# Patient Record
Sex: MALE | Race: WHITE | NOT HISPANIC OR LATINO | Employment: FULL TIME | URBAN - METROPOLITAN AREA
[De-identification: names, ages, dates, MRNs, and addresses within clinical notes are randomized per-mention and may not be internally consistent; named-entity substitution may affect disease eponyms.]

---

## 2023-10-18 ENCOUNTER — OFFICE VISIT (OUTPATIENT)
Dept: PRIMARY CARE | Facility: CLINIC | Age: 38
End: 2023-10-18
Payer: COMMERCIAL

## 2023-10-18 ENCOUNTER — LAB (OUTPATIENT)
Dept: LAB | Facility: LAB | Age: 38
End: 2023-10-18
Payer: COMMERCIAL

## 2023-10-18 VITALS
WEIGHT: 263 LBS | SYSTOLIC BLOOD PRESSURE: 117 MMHG | HEART RATE: 79 BPM | BODY MASS INDEX: 33.75 KG/M2 | HEIGHT: 74 IN | OXYGEN SATURATION: 97 % | DIASTOLIC BLOOD PRESSURE: 76 MMHG

## 2023-10-18 DIAGNOSIS — I87.8 CHRONIC VENOUS STASIS: ICD-10-CM

## 2023-10-18 DIAGNOSIS — M79.89 LEG SWELLING: ICD-10-CM

## 2023-10-18 DIAGNOSIS — T78.3XXA ANGIOEDEMA, INITIAL ENCOUNTER: ICD-10-CM

## 2023-10-18 DIAGNOSIS — Z82.49 FAMILY HISTORY OF DVT: ICD-10-CM

## 2023-10-18 DIAGNOSIS — Z86.718 HX OF DEEP VENOUS THROMBOSIS: ICD-10-CM

## 2023-10-18 DIAGNOSIS — D68.51 FACTOR V LEIDEN (MULTI): ICD-10-CM

## 2023-10-18 DIAGNOSIS — I87.2 CHRONIC VENOUS STASIS DERMATITIS: ICD-10-CM

## 2023-10-18 DIAGNOSIS — D68.51 FACTOR V LEIDEN (MULTI): Primary | ICD-10-CM

## 2023-10-18 PROBLEM — F90.2 ATTENTION DEFICIT HYPERACTIVITY DISORDER (ADHD), COMBINED TYPE: Status: ACTIVE | Noted: 2017-04-11

## 2023-10-18 PROBLEM — L98.9 SKIN LESIONS: Status: ACTIVE | Noted: 2021-05-13

## 2023-10-18 PROBLEM — B00.1 HERPES LABIALIS: Status: ACTIVE | Noted: 2023-02-24

## 2023-10-18 LAB
ANION GAP SERPL CALC-SCNC: 16 MMOL/L (ref 10–20)
BUN SERPL-MCNC: 16 MG/DL (ref 6–23)
C4 SERPL-MCNC: 24 MG/DL (ref 10–50)
CALCIUM SERPL-MCNC: 9.8 MG/DL (ref 8.6–10.6)
CHLORIDE SERPL-SCNC: 104 MMOL/L (ref 98–107)
CO2 SERPL-SCNC: 27 MMOL/L (ref 21–32)
CREAT SERPL-MCNC: 0.89 MG/DL (ref 0.5–1.3)
CRP SERPL-MCNC: <0.1 MG/DL
ERYTHROCYTE [DISTWIDTH] IN BLOOD BY AUTOMATED COUNT: 13.7 % (ref 11.5–14.5)
ERYTHROCYTE [SEDIMENTATION RATE] IN BLOOD BY WESTERGREN METHOD: 4 MM/H (ref 0–15)
GFR SERPL CREATININE-BSD FRML MDRD: >90 ML/MIN/1.73M*2
GLUCOSE SERPL-MCNC: 92 MG/DL (ref 74–99)
HCT VFR BLD AUTO: 48.2 % (ref 41–52)
HGB BLD-MCNC: 15.5 G/DL (ref 13.5–17.5)
MCH RBC QN AUTO: 31 PG (ref 26–34)
MCHC RBC AUTO-ENTMCNC: 32.2 G/DL (ref 32–36)
MCV RBC AUTO: 96 FL (ref 80–100)
NRBC BLD-RTO: 0 /100 WBCS (ref 0–0)
PLATELET # BLD AUTO: 202 X10*3/UL (ref 150–450)
PMV BLD AUTO: 9.6 FL (ref 7.5–11.5)
POTASSIUM SERPL-SCNC: 4.7 MMOL/L (ref 3.5–5.3)
RBC # BLD AUTO: 5 X10*6/UL (ref 4.5–5.9)
SODIUM SERPL-SCNC: 142 MMOL/L (ref 136–145)
WBC # BLD AUTO: 4.6 X10*3/UL (ref 4.4–11.3)

## 2023-10-18 PROCEDURE — 85027 COMPLETE CBC AUTOMATED: CPT

## 2023-10-18 PROCEDURE — G0442 ANNUAL ALCOHOL SCREEN 15 MIN: HCPCS | Performed by: STUDENT IN AN ORGANIZED HEALTH CARE EDUCATION/TRAINING PROGRAM

## 2023-10-18 PROCEDURE — 99213 OFFICE O/P EST LOW 20 MIN: CPT | Performed by: STUDENT IN AN ORGANIZED HEALTH CARE EDUCATION/TRAINING PROGRAM

## 2023-10-18 PROCEDURE — G0444 DEPRESSION SCREEN ANNUAL: HCPCS | Performed by: STUDENT IN AN ORGANIZED HEALTH CARE EDUCATION/TRAINING PROGRAM

## 2023-10-18 PROCEDURE — 85652 RBC SED RATE AUTOMATED: CPT

## 2023-10-18 PROCEDURE — 86140 C-REACTIVE PROTEIN: CPT

## 2023-10-18 PROCEDURE — 86160 COMPLEMENT ANTIGEN: CPT

## 2023-10-18 PROCEDURE — 36415 COLL VENOUS BLD VENIPUNCTURE: CPT

## 2023-10-18 PROCEDURE — 99407 BEHAV CHNG SMOKING > 10 MIN: CPT | Performed by: STUDENT IN AN ORGANIZED HEALTH CARE EDUCATION/TRAINING PROGRAM

## 2023-10-18 PROCEDURE — 80048 BASIC METABOLIC PNL TOTAL CA: CPT

## 2023-10-18 PROCEDURE — 99386 PREV VISIT NEW AGE 40-64: CPT | Performed by: STUDENT IN AN ORGANIZED HEALTH CARE EDUCATION/TRAINING PROGRAM

## 2023-10-18 NOTE — PROGRESS NOTES
"    Subjective   Patient ID: Tin Mead is a 38 y.o. male who presents for the following    Assessment/Plan   #Preventative Health Care  -had blood work done earlier this year in Feb 2023  - and HDL 50. TG wnl at that time.   -A1c 5.2 at that time   -Has not been vaccinated for flu or covid  -Tobacco use: uses chewing tobacco and is interested in quitting but not right now.   -Does not drink alcohol  -Depression screening negative     #Factor V Leiden   #Hx of DVT  #Hx of Thrombectomy   #BLLE Edema   #CVS with Dermatitis   -Has swelling in BLLE and intermittent pain in BLLE (L >R)  -Last DVT and thrombectomy was 20 years ago    PLAN  -Duplex of BLLE ordered   -Compression stockings  -Leg elevation recommended     #Hx of Herpes Labialis   #Possible Angioedema     PLAN  -C1 esterase, CRP, ESR, CBC  -Continue PRN valtrex       Follow up in 2 weeks     HPI  38M presents to establish care.  He was previously seeing a physician at [facility].  He recently moved here for a child.  Currently he would like to discuss a couple issues.  1.  He has a history of factor V Leyden and states that he has noticed minimal increase in swelling in bilateral legs as well as pain in the legs.  He states that this is similar to when he had previous DVT.  He has not been on blood thinners for many years.  Otherwise denies any shortness of breath, chest pain, tachycardia, mopped assist, cough.    2.He notices a subjective swelling around his lips and states that he feels as though he has \"fluid\" stuck in his cheek and around his lips.  Denies any trouble breathing, swelling, trouble managing secretions.  Denies any exposure to allergens or any medication allergies.    No other complaints or concerns at this time    Denies fevers, chills, weight loss, lightheadedness, dizziness, vision changes, sore throat, runny nose, CP, SOB, cough, palpitations, n/v/d, abd pain, black/bloody stools, arthralgias, or new " "numbness/weakness/tingling in arms/legs/face.        PMH: FVL, DVT in BLLEs, hx of anabolic steroid use  Surgeries: thrombectomy in the past    Family Hx  Factor V Leiden     Social  T: Chews tobacco  A: denies  D: denies    Occupation: sales     Personal Hx  Sexually active   Using condoms    Visit Vitals  /76   Pulse 79   Ht 1.88 m (6' 2\")   Wt 119 kg (263 lb)   SpO2 97%   BMI 33.77 kg/m²   Smoking Status Never   BSA 2.49 m²     PHYSICAL EXAM   Physical Exam     Visit Vitals  /76   Pulse 79   Ht 1.88 m (6' 2\")   Wt 119 kg (263 lb)   SpO2 97%   BMI 33.77 kg/m²   Smoking Status Never   BSA 2.49 m²        General: NAD. NCAT. Aox3   HEENT: PERRLA. EOMI. MMM. Nares patent bl. No visible edema or swelling. No oropharyngeal swelling/edema. No tonsillar exudate. No posterior pharyngeal erythema. No CLAD.   Cardiovascular: RRR. No MRG. S1/S2 wnl.   Respiratory: CTABL. No acute respiratory distress.   GI: Soft, NT abdomen. BS present x 4.   : No CVAT BL  MSK: ROM x 4. CTLS non-tender.   Extremities: BLLE CVS with dermatitis. No ulceration. 1+ pitting edema BL (L > R) up to knees.   Skin:  BLLE CVS dermatitis.   Neuro: Aox3. Cranial Nerves grossly intact. Motor/sensory wnl.   Psych: Mood wnl.       REVIEW OF SYSTEMS       Allergies   Allergen Reactions    Grass Pollen Other       No current outpatient medications on file.     No current facility-administered medications for this visit.       Objective     No results found for any previous visit.       Radiology: Reviewed imaging in powerchart.  No results found.    No family history on file.  Social History     Socioeconomic History    Marital status: Single     Spouse name: None    Number of children: None    Years of education: None    Highest education level: None   Occupational History    None   Tobacco Use    Smoking status: Never    Smokeless tobacco: Current     Types: Chew   Substance and Sexual Activity    Alcohol use: Not Currently    Drug use: None "    Sexual activity: None   Other Topics Concern    None   Social History Narrative    None     Social Determinants of Health     Financial Resource Strain: Not on file   Food Insecurity: Not on file   Transportation Needs: Not on file   Physical Activity: Not on file   Stress: Not on file   Social Connections: Not on file   Intimate Partner Violence: Not on file   Housing Stability: Not on file     History reviewed. No pertinent past medical history.  History reviewed. No pertinent surgical history.    Charting was completed using voice recognition technology and may include unintended errors.

## 2023-10-19 ENCOUNTER — TELEPHONE (OUTPATIENT)
Dept: PRIMARY CARE | Facility: CLINIC | Age: 38
End: 2023-10-19
Payer: COMMERCIAL

## 2023-10-19 NOTE — LETTER
October 26, 2023       Dr. Jim Garnica has reviewed your lab results. We tried contacting you but no success. Please review below and return our call with any questions [phone]. Thank you!    ESR, CRP and Complement levels were all normal. There is no lab evidence of angioedema. Will monitor swelling in the face and refer to ENT if it doesn't improve. You can take over the counter Zyrtec and see if that helps.

## 2023-10-19 NOTE — TELEPHONE ENCOUNTER
----- Message from Rehana Fernandez MD sent at 10/19/2023 10:57 AM EDT -----  ESR, CRP and Complement levels were all normal. There is no lab evidence of angioedema. Will monitor swelling in face and refer to ENT if it doesn't improve. He can take over the counter zyrtec and see if that helps.

## 2023-11-01 ENCOUNTER — APPOINTMENT (OUTPATIENT)
Dept: PRIMARY CARE | Facility: CLINIC | Age: 38
End: 2023-11-01
Payer: COMMERCIAL

## 2023-11-03 ENCOUNTER — HOSPITAL ENCOUNTER (OUTPATIENT)
Dept: VASCULAR MEDICINE | Facility: HOSPITAL | Age: 38
Discharge: HOME | End: 2023-11-03
Payer: COMMERCIAL

## 2023-11-03 ENCOUNTER — TELEPHONE (OUTPATIENT)
Dept: PRIMARY CARE | Facility: CLINIC | Age: 38
End: 2023-11-03

## 2023-11-03 ENCOUNTER — TELEMEDICINE (OUTPATIENT)
Dept: PRIMARY CARE | Facility: CLINIC | Age: 38
End: 2023-11-03
Payer: COMMERCIAL

## 2023-11-03 DIAGNOSIS — R60.1 GENERALIZED EDEMA: ICD-10-CM

## 2023-11-03 DIAGNOSIS — D68.51 FACTOR V LEIDEN (MULTI): ICD-10-CM

## 2023-11-03 DIAGNOSIS — M79.89 LEG SWELLING: ICD-10-CM

## 2023-11-03 DIAGNOSIS — Z86.718 HX OF DEEP VENOUS THROMBOSIS: ICD-10-CM

## 2023-11-03 DIAGNOSIS — I82.90 VENOUS THROMBOEMBOLISM: Primary | ICD-10-CM

## 2023-11-03 PROCEDURE — 99443 PR PHYS/QHP TELEPHONE EVALUATION 21-30 MIN: CPT | Performed by: STUDENT IN AN ORGANIZED HEALTH CARE EDUCATION/TRAINING PROGRAM

## 2023-11-03 PROCEDURE — 93970 EXTREMITY STUDY: CPT | Performed by: INTERNAL MEDICINE

## 2023-11-03 PROCEDURE — 93970 EXTREMITY STUDY: CPT

## 2023-11-03 NOTE — PROGRESS NOTES
Subjective   Patient ID: Tin Mead is a 38 y.o. male who presents for the following    Assessment/Plan   #Preventative Health Care (10/18/2023)  -had blood work done earlier this year in Feb 2023  - and HDL 50. TG wnl at that time.   -A1c 5.2 at that time   -Has not been vaccinated for flu or covid  -Tobacco use: uses chewing tobacco and is interested in quitting but not right now.   -Does not drink alcohol  -Depression screening negative     #Factor V Leiden   #Hx of DVT  #Hx of Thrombectomy   #BLLE Edema   #CVS with Dermatitis   -Duplex of BLLE shows RLE: age indeterminate DVT of the common femoral vein. LLE: chronic changes in the venous system.     PLAN  -Eliquis started   -Compression stockings  -Follow up in 1 month. Will repeat duplex in 3 months.   -Advised to go to ER for SOB, CP, lightheadedness, dizzineses.     Follow up in 4 weeks     HPI    Virtual or Telephone Consent    A telephone visit (audio only) between the patient (at the originating site) and the provider (at the distant site) was utilized to provide this telehealth service.   Verbal consent was requested and obtained from Tin Mead on this date, 11/03/23 for a telehealth visit.      38M for followup to go over radiology. Had duplex of BLLE done today which show RLE femoral vein DVT in the setting of FVL disease and hx of prior DVT/PE. Since patient has noticed increased swelling in BLLE; we discussed options to start DOAC. He agrees and we will start Eliquis today. Currently does not have any CP, SOB, palpitations, lightheadedness, dizziness.     No other complaints or concerns at this time    Denies fevers, chills, weight loss, lightheadedness, dizziness, vision changes, sore throat, runny nose, CP, SOB, cough, palpitations, n/v/d, abd pain, black/bloody stools, arthralgias, or new numbness/weakness/tingling in arms/legs/face.      PMH: FVL, DVT in BLLEs, hx of anabolic steroid use  Surgeries: thrombectomy in the  past    Family Hx  Factor V Leiden     Social  T: Chews tobacco  A: denies  D: denies    Occupation: sales     Personal Hx  Sexually active   Using condoms    Visit Vitals  Smoking Status Never     PHYSICAL EXAM   Physical Exam     Visit Vitals  Smoking Status Never      Deferred      REVIEW OF SYSTEMS   In HPI     Allergies   Allergen Reactions    Grass Pollen Other       No current outpatient medications on file.     No current facility-administered medications for this visit.       Objective     Lab on 10/18/2023   Component Date Value Ref Range Status    C4 Complement 10/18/2023 24  10 - 50 mg/dL Final    C-Reactive Protein 10/18/2023 <0.10  <1.00 mg/dL Final    Sedimentation Rate 10/18/2023 4  0 - 15 mm/h Final    WBC 10/18/2023 4.6  4.4 - 11.3 x10*3/uL Final    nRBC 10/18/2023 0.0  0.0 - 0.0 /100 WBCs Final    RBC 10/18/2023 5.00  4.50 - 5.90 x10*6/uL Final    Hemoglobin 10/18/2023 15.5  13.5 - 17.5 g/dL Final    Hematocrit 10/18/2023 48.2  41.0 - 52.0 % Final    MCV 10/18/2023 96  80 - 100 fL Final    MCH 10/18/2023 31.0  26.0 - 34.0 pg Final    MCHC 10/18/2023 32.2  32.0 - 36.0 g/dL Final    RDW 10/18/2023 13.7  11.5 - 14.5 % Final    Platelets 10/18/2023 202  150 - 450 x10*3/uL Final    MPV 10/18/2023 9.6  7.5 - 11.5 fL Final    Glucose 10/18/2023 92  74 - 99 mg/dL Final    Sodium 10/18/2023 142  136 - 145 mmol/L Final    Potassium 10/18/2023 4.7  3.5 - 5.3 mmol/L Final    Chloride 10/18/2023 104  98 - 107 mmol/L Final    Bicarbonate 10/18/2023 27  21 - 32 mmol/L Final    Anion Gap 10/18/2023 16  10 - 20 mmol/L Final    Urea Nitrogen 10/18/2023 16  6 - 23 mg/dL Final    Creatinine 10/18/2023 0.89  0.50 - 1.30 mg/dL Final    eGFR 10/18/2023 >90  >60 mL/min/1.73m*2 Final    Calcium 10/18/2023 9.8  8.6 - 10.6 mg/dL Final       Radiology: Reviewed imaging in powerchart.  No results found.    No family history on file.  Social History     Socioeconomic History    Marital status: Single     Spouse name: Not on  file    Number of children: Not on file    Years of education: Not on file    Highest education level: Not on file   Occupational History    Not on file   Tobacco Use    Smoking status: Never    Smokeless tobacco: Current     Types: Chew   Substance and Sexual Activity    Alcohol use: Not Currently    Drug use: Not on file    Sexual activity: Not on file   Other Topics Concern    Not on file   Social History Narrative    Not on file     Social Determinants of Health     Financial Resource Strain: Not on file   Food Insecurity: Not on file   Transportation Needs: Not on file   Physical Activity: Not on file   Stress: Not on file   Social Connections: Not on file   Intimate Partner Violence: Not on file   Housing Stability: Not on file     No past medical history on file.  No past surgical history on file.    Charting was completed using voice recognition technology and may include unintended errors.

## 2023-11-03 NOTE — TELEPHONE ENCOUNTER
[facility] called stated patient has history of DVT. Imaging did come back positive for chronic DVT in both legs. Radiologist stated she let pt leave since he has had this for about 20 years. Please advise if any recommendations.

## 2024-01-26 ENCOUNTER — OFFICE VISIT (OUTPATIENT)
Dept: PRIMARY CARE | Facility: CLINIC | Age: 39
End: 2024-01-26
Payer: COMMERCIAL

## 2024-01-26 VITALS
TEMPERATURE: 98.8 F | BODY MASS INDEX: 33.75 KG/M2 | WEIGHT: 263 LBS | OXYGEN SATURATION: 99 % | SYSTOLIC BLOOD PRESSURE: 130 MMHG | HEART RATE: 74 BPM | DIASTOLIC BLOOD PRESSURE: 82 MMHG | HEIGHT: 74 IN

## 2024-01-26 DIAGNOSIS — B35.6 TINEA CRURIS: Primary | ICD-10-CM

## 2024-01-26 PROCEDURE — 99213 OFFICE O/P EST LOW 20 MIN: CPT | Performed by: STUDENT IN AN ORGANIZED HEALTH CARE EDUCATION/TRAINING PROGRAM

## 2024-01-26 RX ORDER — DOXYLAMINE SUCCINATE 25 MG
TABLET ORAL 2 TIMES DAILY
Qty: 90 G | Refills: 0 | Status: SHIPPED | OUTPATIENT
Start: 2024-01-26 | End: 2024-02-23

## 2024-01-26 NOTE — PROGRESS NOTES
"    Subjective   Patient ID: Tin Mead is a 38 y.o. male who presents for the following    Assessment/Plan   #Preventative Health Care (10/18/2023)  -had blood work done earlier this year in Feb 2023  - and HDL 50. TG wnl at that time.   -A1c 5.2 at that time   -Has not been vaccinated for flu or covid  -Tobacco use: uses chewing tobacco and is interested in quitting but not right now.   -Does not drink alcohol  -Depression screening negative     #Factor V Leiden   #Hx of DVT  #Hx of Thrombectomy   #BLLE Edema   #CVS with Dermatitis   -Duplex of BLLE shows RLE: age indeterminate DVT of the common femoral vein. LLE: chronic changes in the venous system.     PLAN  -Was on eliquis previously. Stopped taking. No recurrent swelling or pain reported.   -Denies SOB, CP     #Tinia Corporis  -Continue topical miconazole BID x 4 weeks  -Follow up in February 2024 for evaluation     Follow up in 4 weeks     Rash    38M for acute sick visit to discuss facial rash. Patient states that he started using topical miconazole which has helped significantly. He wanted medical evaluation prior to continuing. No other complaints or concerns at this time.     Denies fevers, chills, weight loss, lightheadedness, dizziness, vision changes, sore throat, runny nose, CP, SOB, cough, palpitations, n/v/d, abd pain, black/bloody stools, arthralgias, or new numbness/weakness/tingling in arms/legs/face.      PMH: FVL, DVT in BLLEs, hx of anabolic steroid use  Surgeries: thrombectomy in the past    Family Hx  Factor V Leiden     Social  T: Chews tobacco  A: denies  D: denies    Occupation: sales     Personal Hx  Sexually active   Using condoms    Visit Vitals  /82   Pulse 74   Temp 37.1 °C (98.8 °F)   Ht 1.88 m (6' 2\")   Wt 119 kg (263 lb)   SpO2 99%   BMI 33.77 kg/m²   Smoking Status Never   BSA 2.49 m²     PHYSICAL EXAM   Physical Exam     Visit Vitals  /82   Pulse 74   Temp 37.1 °C (98.8 °F)   Ht 1.88 m (6' 2\")   Wt 119 " kg (263 lb)   SpO2 99%   BMI 33.77 kg/m²   Smoking Status Never   BSA 2.49 m²      General: NAD. NCAT. Aox3   HEENT: PERRLA. EOMI. MMM. Nares patent bl. No visible edema or swelling. No oropharyngeal swelling/edema. No tonsillar exudate. No posterior pharyngeal erythema. No CLAD. Dry fungal rash over the temples, corner or lips and nasolabial folds.   Cardiovascular: RRR. No MRG. S1/S2 wnl.   Respiratory: CTABL. No acute respiratory distress.   GI: Soft, NT abdomen. BS present x 4.   : No CVAT BL  MSK: ROM x 4. CTLS non-tender.   Extremities: BLLE CVS with dermatitis.  Skin:  BLLE CVS dermatitis.   Neuro: Aox3. Cranial Nerves grossly intact. Motor/sensory wnl.   Psych: Mood wnl.             REVIEW OF SYSTEMS   In HPI     Allergies   Allergen Reactions    Grass Pollen Other       Current Outpatient Medications   Medication Sig Dispense Refill    apixaban 5 mg (74 tabs) tablets,dose pack Take 10mg twice daily for 7 days. Then take 5mg twice daily for the remainder of prescription. (Patient not taking: Reported on 1/26/2024) 72 tablet 0     No current facility-administered medications for this visit.       Objective     Lab on 10/18/2023   Component Date Value Ref Range Status    C4 Complement 10/18/2023 24  10 - 50 mg/dL Final    C-Reactive Protein 10/18/2023 <0.10  <1.00 mg/dL Final    Sedimentation Rate 10/18/2023 4  0 - 15 mm/h Final    WBC 10/18/2023 4.6  4.4 - 11.3 x10*3/uL Final    nRBC 10/18/2023 0.0  0.0 - 0.0 /100 WBCs Final    RBC 10/18/2023 5.00  4.50 - 5.90 x10*6/uL Final    Hemoglobin 10/18/2023 15.5  13.5 - 17.5 g/dL Final    Hematocrit 10/18/2023 48.2  41.0 - 52.0 % Final    MCV 10/18/2023 96  80 - 100 fL Final    MCH 10/18/2023 31.0  26.0 - 34.0 pg Final    MCHC 10/18/2023 32.2  32.0 - 36.0 g/dL Final    RDW 10/18/2023 13.7  11.5 - 14.5 % Final    Platelets 10/18/2023 202  150 - 450 x10*3/uL Final    MPV 10/18/2023 9.6  7.5 - 11.5 fL Final    Glucose 10/18/2023 92  74 - 99 mg/dL Final    Sodium  10/18/2023 142  136 - 145 mmol/L Final    Potassium 10/18/2023 4.7  3.5 - 5.3 mmol/L Final    Chloride 10/18/2023 104  98 - 107 mmol/L Final    Bicarbonate 10/18/2023 27  21 - 32 mmol/L Final    Anion Gap 10/18/2023 16  10 - 20 mmol/L Final    Urea Nitrogen 10/18/2023 16  6 - 23 mg/dL Final    Creatinine 10/18/2023 0.89  0.50 - 1.30 mg/dL Final    eGFR 10/18/2023 >90  >60 mL/min/1.73m*2 Final    Calcium 10/18/2023 9.8  8.6 - 10.6 mg/dL Final       Radiology: Reviewed imaging in powerchart.  No results found.    No family history on file.  Social History     Socioeconomic History    Marital status: Single     Spouse name: None    Number of children: None    Years of education: None    Highest education level: None   Occupational History    None   Tobacco Use    Smoking status: Never    Smokeless tobacco: Current     Types: Chew   Substance and Sexual Activity    Alcohol use: Not Currently    Drug use: None    Sexual activity: None   Other Topics Concern    None   Social History Narrative    None     Social Determinants of Health     Financial Resource Strain: Not on file   Food Insecurity: Not on file   Transportation Needs: Not on file   Physical Activity: Not on file   Stress: Not on file   Social Connections: Not on file   Intimate Partner Violence: Not on file   Housing Stability: Not on file     No past medical history on file.  No past surgical history on file.    Charting was completed using voice recognition technology and may include unintended errors.

## 2024-03-08 ENCOUNTER — TELEPHONE (OUTPATIENT)
Dept: PRIMARY CARE | Facility: CLINIC | Age: 39
End: 2024-03-08

## 2024-03-08 DIAGNOSIS — B35.6 TINEA CRURIS: Primary | ICD-10-CM

## 2024-03-08 RX ORDER — FLUCONAZOLE 150 MG/1
150 TABLET ORAL ONCE
Qty: 1 TABLET | Refills: 0 | Status: SHIPPED | OUTPATIENT
Start: 2024-03-08 | End: 2024-03-08 | Stop reason: SDUPTHER

## 2024-03-08 RX ORDER — FLUCONAZOLE 150 MG/1
150 TABLET ORAL
Qty: 4 TABLET | Refills: 0 | Status: SHIPPED | OUTPATIENT
Start: 2024-03-08

## 2024-03-08 NOTE — PROGRESS NOTES
Seen in office last month for tinea corporis.  Patient did not have good response to topical miconazole.  Will prescribe fluconazole 150 mg p.o. once weekly x 4 weeks.  Advised patient that he needs to follow-up in office in 4 weeks.

## 2024-03-08 NOTE — TELEPHONE ENCOUNTER
Topical cream was prescribed for yeast infection.  PT was in a few weeks ago and this helped very little.  He is requesting a oral med if this can be called in.